# Patient Record
Sex: FEMALE | Race: WHITE | NOT HISPANIC OR LATINO | Employment: PART TIME | URBAN - METROPOLITAN AREA
[De-identification: names, ages, dates, MRNs, and addresses within clinical notes are randomized per-mention and may not be internally consistent; named-entity substitution may affect disease eponyms.]

---

## 2021-04-08 DIAGNOSIS — Z23 ENCOUNTER FOR IMMUNIZATION: ICD-10-CM

## 2022-03-14 ENCOUNTER — ANNUAL EXAM (OUTPATIENT)
Dept: OBGYN CLINIC | Facility: CLINIC | Age: 57
End: 2022-03-14
Payer: COMMERCIAL

## 2022-03-14 VITALS
HEIGHT: 65 IN | SYSTOLIC BLOOD PRESSURE: 108 MMHG | DIASTOLIC BLOOD PRESSURE: 62 MMHG | WEIGHT: 148 LBS | BODY MASS INDEX: 24.66 KG/M2

## 2022-03-14 DIAGNOSIS — I10 HYPERTENSION, UNSPECIFIED TYPE: ICD-10-CM

## 2022-03-14 DIAGNOSIS — Z12.31 ENCOUNTER FOR SCREENING MAMMOGRAM FOR MALIGNANT NEOPLASM OF BREAST: ICD-10-CM

## 2022-03-14 DIAGNOSIS — Z01.419 ENCOUNTER FOR GYNECOLOGICAL EXAMINATION WITHOUT ABNORMAL FINDING: Primary | ICD-10-CM

## 2022-03-14 DIAGNOSIS — Z78.0 POSTMENOPAUSAL: ICD-10-CM

## 2022-03-14 DIAGNOSIS — E11.9 TYPE 2 DIABETES MELLITUS WITHOUT COMPLICATION, WITHOUT LONG-TERM CURRENT USE OF INSULIN (HCC): ICD-10-CM

## 2022-03-14 PROCEDURE — 99396 PREV VISIT EST AGE 40-64: CPT | Performed by: OBSTETRICS & GYNECOLOGY

## 2022-03-14 RX ORDER — BACILLUS COAGULANS/INULIN 1B-250 MG
CAPSULE ORAL EVERY 24 HOURS
COMMUNITY

## 2022-03-14 RX ORDER — DIMENHYDRINATE 50 MG
TABLET ORAL
COMMUNITY

## 2022-03-14 RX ORDER — HYDROCHLOROTHIAZIDE 12.5 MG/1
CAPSULE, GELATIN COATED ORAL
COMMUNITY
Start: 2022-01-07

## 2022-03-14 RX ORDER — SITAGLIPTIN 50 MG/1
50 TABLET, FILM COATED ORAL DAILY
COMMUNITY
Start: 2022-03-04

## 2022-03-14 RX ORDER — ATORVASTATIN CALCIUM 10 MG/1
TABLET, FILM COATED ORAL
COMMUNITY
Start: 2022-02-26

## 2022-03-14 RX ORDER — BENAZEPRIL HYDROCHLORIDE 40 MG/1
40 TABLET, FILM COATED ORAL DAILY
COMMUNITY
Start: 2022-01-07

## 2022-03-14 RX ORDER — MULTIVIT-MIN/IRON/FOLIC ACID/K 18-600-40
CAPSULE ORAL EVERY 24 HOURS
COMMUNITY

## 2022-03-14 RX ORDER — B-COMPLEX WITH VITAMIN C
TABLET ORAL EVERY 24 HOURS
COMMUNITY

## 2022-03-14 RX ORDER — AMLODIPINE BESYLATE 5 MG/1
5 TABLET ORAL DAILY
COMMUNITY
Start: 2022-02-23

## 2022-03-14 RX ORDER — CALCIUM CARBONATE/VITAMIN D3 600 MG-10
TABLET ORAL EVERY 24 HOURS
COMMUNITY

## 2022-03-14 NOTE — PROGRESS NOTES
[address] [facility] [address] [facility] [address], [address], [facility], [address] [facility]    ASSESSMENT/PLAN: Ceci Bender is a 60 y o   who presents for annual gynecologic exam     Encounter for routine gynecologic examination  - Routine well woman exam completed today  - Cervical Cancer Screening: Current ASCCP Guidelines reviewed  Last Pap: 2021   Next Pap Due:   - COVID vaccine   +Pfizer x2  + flu shot     - Breast Cancer Screening: Last Mammogram  Order given   No hx of  Abn  bx    - Colorectal cancer screening was not ordered  - The following were reviewed in today's visit: breast self exam, mammography screening ordered, menopause, osteoporosis, adequate intake of calcium and vitamin D, exercise, healthy diet and colonoscopy discussed and ordered    Additional problems addressed during this visit:  1  Encounter for gynecological examination without abnormal finding  Comments:  Pap in one year no hx of abn     2  Encounter for screening mammogram for malignant neoplasm of breast  -     Mammo screening bilateral w 3d & cad; Future    3  Postmenopausal  Comments:  Denies  bleeeding, blaoting and satiety     4  Hypertension, unspecified type  Comments:  followed by primary      5  Type 2 diabetes mellitus without complication, without long-term current use of insulin ([facility])  Comments:  Pt  reports  fastings  140   A1c 7 around   No vaginal itching or burning         CC: Annual Gynecologic Examination    HPI: Ceci Bender is a 60 y o   who presents for annual gynecologic examination  61 yo here for  Wellness exam     No bleeding, bloating and  Satiety  Menopause  At  Age  46      +  Type  2  Diabetes   HTN      The following portions of the patient's history were reviewed and updated as appropriate: She  has a past medical history of Diabetes mellitus ([address] ), Gestational diabetes (), and Hypertension  She  has a past surgical history that includes DXA procedure(historical);  Colonoscopy; Appendectomy; [place] tooth extraction; and LAPAROSCOPY  Her family history includes Diabetes in her mother; Hypertension in her father  She  reports that she has never smoked  She does not have any smokeless tobacco history on file  She reports current alcohol use  She reports that she does not use drugs  Current Outpatient Medications   Medication Sig Dispense Refill    amLODIPine (NORVASC) 5 mg tablet Take 5 mg by mouth daily      Ascorbic Acid (Vitamin C) 500 MG/5ML LIQD as directed      atorvastatin (LIPITOR) 10 mg tablet       Bacillus Coagulans-Inulin (Probiotic) 1-250 BILLION-MG CAPS every 24 hours      benazepril (LOTENSIN) 40 MG tablet Take 40 mg by mouth daily      Cholecalciferol (Vitamin D) 50 MCG (2000 UT) CAPS every 24 hours      Coenzyme Q10 (Co Q 10) 100 MG CAPS every 24 hours      Flaxseed, Linseed, (Flax Seed Oil) 1000 MG CAPS as directed      Glucose Blood (FREESTYLE TEST VI) Please specify directions, refills and quantity      hydrochlorothiazide (MICROZIDE) 12 5 mg capsule TAKE 1 CAPSULE BY MOUTH ONCE DAILY IN THE MORNING FOR 90 DAYS      Januvia 50 MG tablet Take 50 mg by mouth daily      metFORMIN (GLUCOPHAGE) 500 mg tablet       Omega 3 1200 MG CAPS every 24 hours      Zinc 100 MG TABS every 24 hours       No current facility-administered medications for this visit  She has No Known Allergies       Review of Systems:  All systems normal except as noted in HPI          Objective:  /62 (BP Location: Left arm, Patient Position: Sitting, Cuff Size: Standard)   Ht 5' 5" (1 651 m)   Wt 67 1 kg (148 lb)   BMI 24 63 kg/m²    Physical Exam  Vitals and nursing note reviewed  Constitutional:       Appearance: Normal appearance  HENT:      Head: Normocephalic  Cardiovascular:      Rate and Rhythm: Normal rate and regular rhythm  Pulses: Normal pulses  Heart sounds: Normal heart sounds     Pulmonary:      Effort: Pulmonary effort is normal       Breath sounds: Normal breath sounds  Chest:      Chest wall: No mass, lacerations, swelling, tenderness or edema  Breasts: Mahendra Score is 4  Breasts are symmetrical       Right: Normal  No swelling, bleeding, inverted nipple, mass, nipple discharge, skin change, tenderness, axillary adenopathy or supraclavicular adenopathy  Left: No swelling, bleeding, inverted nipple, mass, nipple discharge, skin change, tenderness, axillary adenopathy or supraclavicular adenopathy  Abdominal:      General: Abdomen is flat  Bowel sounds are normal       Palpations: Abdomen is soft  Genitourinary:     General: Normal vulva  Exam position: Lithotomy position  Pubic Area: No rash  Mahendra stage (genital): 4       Labia:         Right: No rash, tenderness or lesion  Left: No rash, tenderness or lesion  Urethra: No urethral pain, urethral swelling or urethral lesion  Vagina: Normal       Cervix: No cervical motion tenderness or discharge  Uterus: Normal        Adnexa: Right adnexa normal and left adnexa normal       Rectum: Normal    Musculoskeletal:         General: Normal range of motion  Cervical back: Neck supple  Lymphadenopathy:      Upper Body:      Right upper body: No supraclavicular, axillary or pectoral adenopathy  Left upper body: No supraclavicular, axillary or pectoral adenopathy  Lower Body: No right inguinal adenopathy  No left inguinal adenopathy  Skin:     General: Skin is warm and dry  Neurological:      General: No focal deficit present  Mental Status: She is alert and oriented to person, place, and time  Psychiatric:         Mood and Affect: Mood normal          Behavior: Behavior normal          Thought Content:  Thought content normal          Judgment: Judgment normal

## 2023-03-20 ENCOUNTER — ANNUAL EXAM (OUTPATIENT)
Dept: OBGYN CLINIC | Facility: CLINIC | Age: 58
End: 2023-03-20

## 2023-03-20 VITALS
WEIGHT: 141.4 LBS | HEIGHT: 65 IN | DIASTOLIC BLOOD PRESSURE: 78 MMHG | SYSTOLIC BLOOD PRESSURE: 136 MMHG | BODY MASS INDEX: 23.56 KG/M2

## 2023-03-20 DIAGNOSIS — Z01.419 ENCOUNTER FOR GYNECOLOGICAL EXAMINATION WITHOUT ABNORMAL FINDING: Primary | ICD-10-CM

## 2023-03-20 DIAGNOSIS — Z78.0 POSTMENOPAUSAL: ICD-10-CM

## 2023-03-20 DIAGNOSIS — Z12.31 ENCOUNTER FOR SCREENING MAMMOGRAM FOR MALIGNANT NEOPLASM OF BREAST: ICD-10-CM

## 2023-03-20 RX ORDER — DAPAGLIFLOZIN 10 MG/1
10 TABLET, FILM COATED ORAL DAILY
COMMUNITY
Start: 2023-02-20

## 2023-03-20 NOTE — PROGRESS NOTES
[address], [address], [facility], [address] [facility]    ASSESSMENT/PLAN: Valdez Montero is a 59 y o   who presents for annual gynecologic exam     Encounter for routine gynecologic examination  - Routine well woman exam completed today  - Cervical Cancer Screening: Current ASCCP Guidelines reviewed  Last Pap: 2021   Next Pap Due:   - COVID vaccine  Pfizer   X 3   - Breast Cancer Screening: Last Mammogram 2022,     Additional problems addressed during this visit:  1  Encounter for gynecological examination without abnormal finding    2  Encounter for screening mammogram for malignant neoplasm of breast  -     Mammo screening bilateral w 3d & cad; Future    3  Postmenopausal        CC: Annual Gynecologic Examination    HPI: Valdez Montero is a 59 y o   who presents for annual gynecologic examination  58 yo  Post menopausal here for wellness exam   No bleeding, bloating or satiety  Working on sugars  w primary  The following portions of the patient's history were reviewed and updated as appropriate: She  has a past medical history of Diabetes mellitus ([facility] [address] ), Gestational diabetes (), and Hypertension  She  has a past surgical history that includes DXA procedure(historical); Colonoscopy; Appendectomy; [place] tooth extraction; and LAPAROSCOPY  Her family history includes Diabetes in her mother; Hypertension in her father  She  reports that she has never smoked  She has never used smokeless tobacco  She reports current alcohol use  She reports that she does not use drugs    Current Outpatient Medications   Medication Sig Dispense Refill   • amLODIPine (NORVASC) 5 mg tablet Take 5 mg by mouth daily     • Ascorbic Acid (Vitamin C) 500 MG/5ML LIQD as directed     • atorvastatin (LIPITOR) 10 mg tablet      • Bacillus Coagulans-Inulin (Probiotic) 1-250 BILLION-MG CAPS every 24 hours     • benazepril (LOTENSIN) 40 MG tablet Take 40 mg by mouth daily     • Cholecalciferol (Vitamin D) 50 MCG (2000 UT) CAPS every 24 hours     • Coenzyme Q10 (Co Q 10) 100 MG CAPS every 24 hours     • Farxiga 10 MG tablet Take 10 mg by mouth daily     • Flaxseed, Linseed, (Flax Seed Oil) 1000 MG CAPS as directed     • Glucose Blood (FREESTYLE TEST VI) Please specify directions, refills and quantity     • hydrochlorothiazide (MICROZIDE) 12 5 mg capsule TAKE 1 CAPSULE BY MOUTH ONCE DAILY IN THE MORNING FOR 90 DAYS     • Januvia 50 MG tablet Take 50 mg by mouth daily     • metFORMIN (GLUCOPHAGE) 500 mg tablet      • Omega 3 1200 MG CAPS every 24 hours     • Zinc 100 MG TABS every 24 hours       No current facility-administered medications for this visit  She has No Known Allergies       Review of Systems   Constitutional: Negative for chills and fever  HENT: Negative for ear pain and sore throat  Eyes: Negative for pain and visual disturbance  Respiratory: Negative for cough and shortness of breath  Cardiovascular: Negative for chest pain and palpitations  Gastrointestinal: Negative for abdominal pain and vomiting  Genitourinary: Negative for dysuria and hematuria  Musculoskeletal: Negative for arthralgias and back pain  Skin: Negative for color change and rash  Neurological: Negative for seizures and syncope  Hematological: Negative  Psychiatric/Behavioral: Negative  All other systems reviewed and are negative  Objective:  /78 (BP Location: Left arm, Patient Position: Sitting, Cuff Size: Standard)   Ht 5' 5 18" (1 656 m)   Wt 64 1 kg (141 lb 6 4 oz)   Breastfeeding No   BMI 23 40 kg/m²    Physical Exam  Vitals and nursing note reviewed  Constitutional:       Appearance: Normal appearance  HENT:      Head: Normocephalic  Cardiovascular:      Rate and Rhythm: Normal rate and regular rhythm  Pulses: Normal pulses  Heart sounds: Normal heart sounds  Pulmonary:      Effort: Pulmonary effort is normal       Breath sounds: Normal breath sounds  Chest:      Chest wall: No mass, lacerations, swelling, tenderness or edema  Breasts: Mahendra Score is 4  Breasts are symmetrical       Right: Normal  No swelling, bleeding, inverted nipple, mass, nipple discharge, skin change or tenderness  Left: No swelling, bleeding, inverted nipple, mass, nipple discharge, skin change or tenderness  Abdominal:      General: Abdomen is flat  Bowel sounds are normal       Palpations: Abdomen is soft  Genitourinary:     General: Normal vulva  Exam position: Lithotomy position  Pubic Area: No rash  Mahendra stage (genital): 4       Labia:         Right: No rash, tenderness or lesion  Left: No rash, tenderness or lesion  Urethra: No urethral pain, urethral swelling or urethral lesion  Vagina: Normal       Cervix: No cervical motion tenderness or discharge  Uterus: Normal        Adnexa: Right adnexa normal and left adnexa normal       Rectum: Normal    Musculoskeletal:         General: Normal range of motion  Cervical back: Neck supple  Lymphadenopathy:      Upper Body:      Right upper body: No supraclavicular, axillary or pectoral adenopathy  Left upper body: No supraclavicular, axillary or pectoral adenopathy  Lower Body: No right inguinal adenopathy  No left inguinal adenopathy  Skin:     General: Skin is warm and dry  Neurological:      General: No focal deficit present  Mental Status: She is alert and oriented to person, place, and time  Psychiatric:         Mood and Affect: Mood normal          Behavior: Behavior normal          Thought Content:  Thought content normal          Judgment: Judgment normal

## 2023-05-24 DIAGNOSIS — Z12.31 ENCOUNTER FOR SCREENING MAMMOGRAM FOR MALIGNANT NEOPLASM OF BREAST: ICD-10-CM

## 2024-05-20 PROBLEM — Z12.4 SCREENING FOR CERVICAL CANCER: Status: ACTIVE | Noted: 2024-05-20

## 2024-05-20 NOTE — PROGRESS NOTES
[facility] - [address], [address], [place] [address]    ASSESSMENT/PLAN: Dariela Styles is a 58 y.o.  who presents for annual gynecologic exam.    Encounter for routine gynecologic examination  - Routine well woman exam completed today.  - Cervical Cancer Screening: Current ASCCP Guidelines reviewed. Last Pap: 2021 . Next Pap Due: today :   - Breast Cancer Screening: Last Mammogram 2023,   - Colorectal cancer screening was not ordered.  - The following were reviewed in today's visit: breast self exam, mammography screening ordered, menopause, adequate intake of calcium and vitamin D, exercise, healthy diet, and colonoscopy discussed and ordered    Additional problems addressed during this visit:  1. Encounter for gynecological examination without abnormal finding  2. Screening for cervical cancer  -     IGP, Aptima HPV, Rfx 16/18,45  3. Hypertension, unspecified type  4. Postmenopausal  5. Encounter for screening mammogram for malignant neoplasm of breast  -     Mammo screening bilateral w 3d & cad; Future      CC:  Annual Gynecologic Examination    HPI: Dariela Styles is a 58 y.o.  who presents for annual gynecologic examination.  59 yo here for wellness exams.   No bleeding , bloating or satiety.  + night sweats    no hot flashes.          The following portions of the patient's history were reviewed and updated as appropriate: She  has a past medical history of Diabetes mellitus (), Gestational diabetes (), History of screening mammography (2023), Hypertension, and Screening for breast cancer (2023).  She  has a past surgical history that includes DXA procedure(historical); Colonoscopy; Appendectomy; [place] tooth extraction; and LAPAROSCOPY.  Her family history includes Diabetes in her mother; Hypertension in her father.  She  reports that she has never smoked. She has never used smokeless tobacco. She reports current alcohol use. She reports that she does  "not use drugs.  Current Outpatient Medications   Medication Sig Dispense Refill   • amLODIPine (NORVASC) 5 mg tablet Take 5 mg by mouth daily     • Ascorbic Acid (Vitamin C) 500 MG/5ML LIQD as directed     • atorvastatin (LIPITOR) 10 mg tablet      • Bacillus Coagulans-Inulin (Probiotic) 1-250 BILLION-MG CAPS every 24 hours     • benazepril (LOTENSIN) 40 MG tablet Take 40 mg by mouth daily     • Cholecalciferol (Vitamin D) 50 MCG (2000 UT) CAPS every 24 hours     • Coenzyme Q10 (Co Q 10) 100 MG CAPS every 24 hours     • Farxiga 10 MG tablet Take 10 mg by mouth daily     • Flaxseed, Linseed, (Flax Seed Oil) 1000 MG CAPS as directed     • Glucose Blood (FREESTYLE TEST VI) Please specify directions, refills and quantity     • hydrochlorothiazide (MICROZIDE) 12.5 mg capsule TAKE 1 CAPSULE BY MOUTH ONCE DAILY IN THE MORNING FOR 90 DAYS     • Januvia 50 MG tablet Take 50 mg by mouth daily     • metFORMIN (GLUCOPHAGE) 500 mg tablet      • Omega 3 1200 MG CAPS every 24 hours     • Zinc 100 MG TABS every 24 hours       No current facility-administered medications for this visit.     She has No Known Allergies..    Review of Systems:  All systems normal except as noted in HPI          Objective:  /76 (BP Location: Left arm, Patient Position: Sitting, Cuff Size: Standard)   Ht 5' 6\" (1.676 m)   Wt 65.3 kg (144 lb)   BMI 23.24 kg/m²    Physical Exam  Vitals and nursing note reviewed.   Constitutional:       Appearance: Normal appearance.   HENT:      Head: Normocephalic.   Cardiovascular:      Rate and Rhythm: Normal rate and regular rhythm.      Pulses: Normal pulses.      Heart sounds: Normal heart sounds.   Pulmonary:      Effort: Pulmonary effort is normal.      Breath sounds: Normal breath sounds.   Chest:      Chest wall: No mass, lacerations, swelling, tenderness or edema.   Breasts:     Mahendra Score is 4.      Breasts are symmetrical.      Right: Normal. No swelling, bleeding, inverted nipple, mass, nipple " discharge, skin change or tenderness.      Left: No swelling, bleeding, inverted nipple, mass, nipple discharge, skin change or tenderness.   Abdominal:      General: Abdomen is flat. Bowel sounds are normal.      Palpations: Abdomen is soft.   Genitourinary:     General: Normal vulva.      Exam position: Lithotomy position.      Pubic Area: No rash.       Mahendra stage (genital): 4.      Labia:         Right: No rash, tenderness or lesion.         Left: No rash, tenderness or lesion.       Urethra: No urethral pain, urethral swelling or urethral lesion.      Vagina: Normal.      Cervix: No cervical motion tenderness or discharge.      Uterus: Normal.       Adnexa: Right adnexa normal and left adnexa normal.      Rectum: Normal.   Musculoskeletal:         General: Normal range of motion.      Cervical back: Neck supple.   Lymphadenopathy:      Upper Body:      Right upper body: No supraclavicular, axillary or pectoral adenopathy.      Left upper body: No supraclavicular, axillary or pectoral adenopathy.      Lower Body: No right inguinal adenopathy. No left inguinal adenopathy.   Skin:     General: Skin is warm and dry.   Neurological:      General: No focal deficit present.      Mental Status: She is alert and oriented to person, place, and time.   Psychiatric:         Mood and Affect: Mood normal.         Behavior: Behavior normal.         Thought Content: Thought content normal.         Judgment: Judgment normal.

## 2024-05-21 ENCOUNTER — ANNUAL EXAM (OUTPATIENT)
Dept: OBGYN CLINIC | Facility: CLINIC | Age: 59
End: 2024-05-21
Payer: COMMERCIAL

## 2024-05-21 VITALS
BODY MASS INDEX: 23.14 KG/M2 | SYSTOLIC BLOOD PRESSURE: 110 MMHG | HEIGHT: 66 IN | DIASTOLIC BLOOD PRESSURE: 76 MMHG | WEIGHT: 144 LBS

## 2024-05-21 DIAGNOSIS — Z12.31 ENCOUNTER FOR SCREENING MAMMOGRAM FOR MALIGNANT NEOPLASM OF BREAST: ICD-10-CM

## 2024-05-21 DIAGNOSIS — Z12.4 SCREENING FOR CERVICAL CANCER: ICD-10-CM

## 2024-05-21 DIAGNOSIS — Z01.419 ENCOUNTER FOR GYNECOLOGICAL EXAMINATION WITHOUT ABNORMAL FINDING: Primary | ICD-10-CM

## 2024-05-21 DIAGNOSIS — Z78.0 POSTMENOPAUSAL: ICD-10-CM

## 2024-05-21 DIAGNOSIS — I10 HYPERTENSION, UNSPECIFIED TYPE: ICD-10-CM

## 2024-05-21 PROCEDURE — 99396 PREV VISIT EST AGE 40-64: CPT | Performed by: OBSTETRICS & GYNECOLOGY

## 2024-05-25 LAB
CYTOLOGIST CVX/VAG CYTO: NORMAL
DX ICD CODE: NORMAL
HPV GENOTYPE REFLEX: NORMAL
HPV I/H RISK 4 DNA CVX QL PROBE+SIG AMP: NEGATIVE
OTHER STN SPEC: NORMAL
PATH REPORT.FINAL DX SPEC: NORMAL
SL AMB NOTE:: NORMAL
SL AMB SPECIMEN ADEQUACY: NORMAL
SL AMB TEST METHODOLOGY: NORMAL

## 2024-06-19 PROBLEM — Z12.4 SCREENING FOR CERVICAL CANCER: Status: RESOLVED | Noted: 2024-05-20 | Resolved: 2024-06-19

## 2024-06-20 PROBLEM — Z01.419 ENCOUNTER FOR GYNECOLOGICAL EXAMINATION WITHOUT ABNORMAL FINDING: Status: RESOLVED | Noted: 2024-05-21 | Resolved: 2024-06-20

## 2024-06-27 DIAGNOSIS — Z12.31 ENCOUNTER FOR SCREENING MAMMOGRAM FOR MALIGNANT NEOPLASM OF BREAST: ICD-10-CM

## 2025-06-03 ENCOUNTER — ANNUAL EXAM (OUTPATIENT)
Dept: OBGYN CLINIC | Facility: CLINIC | Age: 60
End: 2025-06-03
Payer: COMMERCIAL

## 2025-06-03 VITALS
HEIGHT: 65 IN | WEIGHT: 144 LBS | DIASTOLIC BLOOD PRESSURE: 74 MMHG | BODY MASS INDEX: 23.99 KG/M2 | SYSTOLIC BLOOD PRESSURE: 122 MMHG

## 2025-06-03 DIAGNOSIS — Z78.0 POSTMENOPAUSAL: ICD-10-CM

## 2025-06-03 DIAGNOSIS — Z12.31 ENCOUNTER FOR SCREENING MAMMOGRAM FOR MALIGNANT NEOPLASM OF BREAST: ICD-10-CM

## 2025-06-03 DIAGNOSIS — Z01.419 ENCOUNTER FOR GYNECOLOGICAL EXAMINATION WITHOUT ABNORMAL FINDING: Primary | ICD-10-CM

## 2025-06-03 PROCEDURE — 99396 PREV VISIT EST AGE 40-64: CPT | Performed by: OBSTETRICS & GYNECOLOGY

## 2025-06-03 NOTE — PATIENT INSTRUCTIONS
Calcium 1200-1500mg + 600-1000 IU Vit D daily. Exercise 150 minutes per week minimum including weight bearing exercises. Pap with high risk HPV Q 3-5 years.  Annual mammogram and monthly breast self exam recommended.  Colonoscopy-  encouraged      . Kegels 20 times twice daily. Silicone based lubricant with sex. Vaginal moisturizers twice weekly as needed.     90.7

## 2025-06-03 NOTE — PROGRESS NOTES
[facility] - [address], [address], [place] [address]    ASSESSMENT/PLAN: Dariela Styles is a 59 y.o.  who presents for annual gynecologic exam.    Encounter for routine gynecologic examination  - Routine well woman exam completed today.  - Cervical Cancer Screening: Current ASCCP Guidelines reviewed. Last Pap: 2024 . Next Pap Due:   - Breast Cancer Screening: Last Mammogram 2024, birads 2   - Colorectal cancer screening was not ordered.  - The following were reviewed in today's visit: breast self exam, mammography screening ordered, menopause, adequate intake of calcium and vitamin D, exercise, healthy diet, and colonoscopy discussed     Additional problems addressed during this visit:  1. Encounter for gynecological examination without abnormal finding  2. Encounter for screening mammogram for malignant neoplasm of breast  -     Mammo screening bilateral w 3d and cad; Future  3. Postmenopausal      CC:  Annual Gynecologic Examination    HPI: Dariela Styles is a 59 y.o.  who presents for annual gynecologic examination.  58 yo post menopausal  here for wellness exam.    No bleeding, bloating or satiety.  + night  sweats   + vaginal dryness.   Working on   hgb   a 1 C    No hx of  bone  fx since menopause.  No hx of  hip fx in parents .    The following portions of the patient's history were reviewed and updated as appropriate: She  has a past medical history of Diabetes mellitus (), Gestational diabetes (), History of screening mammography (2024), Hypertension, and Screening for breast cancer (2023).  She  has a past surgical history that includes DXA procedure(); Colonoscopy (); Appendectomy; [place] tooth extraction; and LAPAROSCOPY.  Her family history includes Diabetes in her mother; Hypertension in her father.  She  reports that she has never smoked. She has never been exposed to tobacco smoke. She has never used smokeless tobacco. She  "reports current alcohol use. She reports that she does not use drugs.  Current Outpatient Medications   Medication Sig Dispense Refill   • amLODIPine (NORVASC) 5 mg tablet Take 5 mg by mouth in the morning.     • Ascorbic Acid (Vitamin C) 500 MG/5ML LIQD      • atorvastatin (LIPITOR) 10 mg tablet      • Bacillus Coagulans-Inulin (Probiotic) 1-250 BILLION-MG CAPS every 24 hours     • benazepril (LOTENSIN) 40 MG tablet Take 40 mg by mouth in the morning.     • Cholecalciferol (Vitamin D) 50 MCG (2000 UT) CAPS every 24 hours     • Coenzyme Q10 (Co Q 10) 100 MG CAPS every 24 hours     • Farxiga 10 MG tablet Take 10 mg by mouth in the morning.     • Flaxseed, Linseed, (Flax Seed Oil) 1000 MG CAPS      • Glucose Blood (FREESTYLE TEST VI)      • hydrochlorothiazide (MICROZIDE) 12.5 mg capsule      • Januvia 50 MG tablet Take 50 mg by mouth in the morning.     • metFORMIN (GLUCOPHAGE) 500 mg tablet      • Omega 3 1200 MG CAPS every 24 hours     • Zinc 100 MG TABS every 24 hours       No current facility-administered medications for this visit.     She has no known allergies..    Review of Systems:  All systems normal except as noted in HPI          Objective:  /74 (BP Location: Left arm, Patient Position: Sitting, Cuff Size: Standard)   Ht 5' 5\" (1.651 m)   Wt 65.3 kg (144 lb)   BMI 23.96 kg/m²    Physical Exam  Vitals and nursing note reviewed.   Constitutional:       Appearance: Normal appearance.   HENT:      Head: Normocephalic.     Cardiovascular:      Rate and Rhythm: Normal rate and regular rhythm.      Pulses: Normal pulses.      Heart sounds: Normal heart sounds.   Pulmonary:      Effort: Pulmonary effort is normal.      Breath sounds: Normal breath sounds.   Chest:      Chest wall: No mass, lacerations, swelling, tenderness or edema.   Breasts:     Mahendra Score is 4.      Breasts are symmetrical.      Right: Normal. No swelling, bleeding, inverted nipple, mass, nipple discharge, skin change or tenderness. "      Left: No swelling, bleeding, inverted nipple, mass, nipple discharge, skin change or tenderness.   Abdominal:      General: Abdomen is flat. Bowel sounds are normal.      Palpations: Abdomen is soft.   Genitourinary:     General: Normal vulva.      Exam position: Lithotomy position.      Pubic Area: No rash.       Mahendra stage (genital): 4.      Labia:         Right: No rash, tenderness or lesion.         Left: No rash, tenderness or lesion.       Urethra: No urethral pain, urethral swelling or urethral lesion.      Vagina: Normal.      Cervix: No cervical motion tenderness or discharge.      Uterus: Normal.       Adnexa: Right adnexa normal and left adnexa normal.      Rectum: Normal.     Musculoskeletal:         General: Normal range of motion.      Cervical back: Normal range of motion and neck supple.   Lymphadenopathy:      Upper Body:      Right upper body: No supraclavicular, axillary or pectoral adenopathy.      Left upper body: No supraclavicular, axillary or pectoral adenopathy.      Lower Body: No right inguinal adenopathy. No left inguinal adenopathy.     Skin:     General: Skin is warm and dry.     Neurological:      General: No focal deficit present.      Mental Status: She is alert and oriented to person, place, and time.     Psychiatric:         Mood and Affect: Mood normal.         Behavior: Behavior normal.         Thought Content: Thought content normal.         Judgment: Judgment normal.

## 2025-07-11 DIAGNOSIS — Z12.31 ENCOUNTER FOR SCREENING MAMMOGRAM FOR MALIGNANT NEOPLASM OF BREAST: ICD-10-CM
